# Patient Record
Sex: MALE | Race: WHITE | NOT HISPANIC OR LATINO | Employment: FULL TIME | URBAN - METROPOLITAN AREA
[De-identification: names, ages, dates, MRNs, and addresses within clinical notes are randomized per-mention and may not be internally consistent; named-entity substitution may affect disease eponyms.]

---

## 2018-07-13 ENCOUNTER — TRANSCRIBE ORDERS (OUTPATIENT)
Dept: ADMINISTRATIVE | Facility: HOSPITAL | Age: 28
End: 2018-07-13

## 2018-07-13 DIAGNOSIS — R94.5 ABNORMAL LIVER FUNCTION: Primary | ICD-10-CM

## 2018-07-20 ENCOUNTER — APPOINTMENT (OUTPATIENT)
Dept: ULTRASOUND IMAGING | Facility: HOSPITAL | Age: 28
End: 2018-07-20

## 2018-08-01 ENCOUNTER — HOSPITAL ENCOUNTER (OUTPATIENT)
Dept: ULTRASOUND IMAGING | Facility: HOSPITAL | Age: 28
Discharge: HOME | End: 2018-08-01
Admitting: FAMILY MEDICINE

## 2018-08-01 DIAGNOSIS — R94.5 ABNORMAL LIVER FUNCTION: ICD-10-CM

## 2018-08-01 PROCEDURE — 76705 ECHO EXAM OF ABDOMEN: CPT

## 2024-09-27 DIAGNOSIS — Z76.0 ENCOUNTER FOR ISSUE OF REPEAT PRESCRIPTION: Primary | ICD-10-CM

## 2024-09-30 RX ORDER — ESCITALOPRAM OXALATE 10 MG/1
10 TABLET ORAL DAILY
Qty: 90 TABLET | Refills: 1 | Status: SHIPPED | OUTPATIENT
Start: 2024-09-30

## 2024-09-30 RX ORDER — LISINOPRIL 10 MG/1
10 TABLET ORAL DAILY
Qty: 90 TABLET | Refills: 1 | Status: SHIPPED | OUTPATIENT
Start: 2024-09-30

## 2024-10-07 ENCOUNTER — TELEPHONE (OUTPATIENT)
Age: 34
End: 2024-10-07
Payer: COMMERCIAL

## 2025-04-10 DIAGNOSIS — Z76.0 ENCOUNTER FOR ISSUE OF REPEAT PRESCRIPTION: ICD-10-CM

## 2025-04-10 RX ORDER — LISINOPRIL 10 MG/1
10 TABLET ORAL DAILY
Qty: 90 TABLET | Refills: 1 | OUTPATIENT
Start: 2025-04-10

## 2025-04-10 RX ORDER — ESCITALOPRAM OXALATE 10 MG/1
10 TABLET ORAL DAILY
Qty: 90 TABLET | Refills: 1 | OUTPATIENT
Start: 2025-04-10

## 2025-04-10 NOTE — TELEPHONE ENCOUNTER
Caller: Mauro Starkey    Relationship: Self    Best call back number: [phone]    Requested Prescriptions:   Requested Prescriptions     Pending Prescriptions Disp Refills    escitalopram (Lexapro) 10 MG tablet 90 tablet 1     Sig: Take 1 tablet by mouth Daily.    lisinopril (PRINIVIL,ZESTRIL) 10 MG tablet 90 tablet 1     Sig: Take 1 tablet by mouth Daily.     Pharmacy where request should be sent: [facility] #[id number] - [address] AT [place] [address] - [phone] [facility] [phone] FX     Last office visit with prescribing clinician: Visit date not found   Last telemedicine visit with prescribing clinician: Visit date not found   Next office visit with prescribing clinician: Visit date not found     Additional details provided by patient: PATIENT NEEDS THESE APPROVED.     Brent Lacey Rep   04/10/25 13:36 EDT

## 2025-04-15 DIAGNOSIS — Z76.0 ENCOUNTER FOR ISSUE OF REPEAT PRESCRIPTION: ICD-10-CM

## 2025-04-15 RX ORDER — ESCITALOPRAM OXALATE 10 MG/1
10 TABLET ORAL DAILY
Qty: 30 TABLET | Refills: 0 | Status: SHIPPED | OUTPATIENT
Start: 2025-04-15

## 2025-04-15 RX ORDER — LISINOPRIL 10 MG/1
10 TABLET ORAL DAILY
Qty: 30 TABLET | Refills: 1 | Status: SHIPPED | OUTPATIENT
Start: 2025-04-15

## 2025-04-15 NOTE — TELEPHONE ENCOUNTER
Caller: Mauro Starkey LORENA    Relationship: Self    Best call back number: [phone]     Requested Prescriptions:   Requested Prescriptions     Pending Prescriptions Disp Refills    lisinopril (PRINIVIL,ZESTRIL) 10 MG tablet 90 tablet 1     Sig: Take 1 tablet by mouth Daily.    escitalopram (Lexapro) 10 MG tablet 90 tablet 1     Sig: Take 1 tablet by mouth Daily.        Pharmacy where request should be sent: [facility] #[id number] - [address] AT [place] [address] - [phone] [facility] [phone] FX     Last office visit with prescribing clinician: Visit date not found   Last telemedicine visit with prescribing clinician: Visit date not found   Next office visit with prescribing clinician: Visit date not found     Additional details provided by patient: PATIENT'S WIFE IS A WEEK OVER DUE TO HAVE BAB, SHE HAS HAD A ROUGH PREGNANCY, THIS IS MAKING IT HARD FOR THE PATIENT TO MAKE AND KEEP APPOINTMENTS      Would you like a call back once the refill request has been completed: [x] Yes [] No    If the office needs to give you a call back, can they leave a voicemail: [x] Yes [] No    Brent Marie Rep   04/15/25 09:28 EDT

## 2025-04-25 ENCOUNTER — OFFICE VISIT (OUTPATIENT)
Age: 35
End: 2025-04-25
Payer: COMMERCIAL

## 2025-04-25 VITALS
TEMPERATURE: 96.8 F | OXYGEN SATURATION: 97 % | RESPIRATION RATE: 18 BRPM | BODY MASS INDEX: 41.58 KG/M2 | WEIGHT: 297 LBS | HEIGHT: 71 IN | DIASTOLIC BLOOD PRESSURE: 82 MMHG | SYSTOLIC BLOOD PRESSURE: 120 MMHG | HEART RATE: 82 BPM

## 2025-04-25 DIAGNOSIS — E66.01 MORBID (SEVERE) OBESITY DUE TO EXCESS CALORIES: ICD-10-CM

## 2025-04-25 DIAGNOSIS — F41.9 ANXIETY: ICD-10-CM

## 2025-04-25 DIAGNOSIS — E78.2 MODERATE MIXED HYPERLIPIDEMIA NOT REQUIRING STATIN THERAPY: Primary | ICD-10-CM

## 2025-04-25 DIAGNOSIS — R68.82 LOW LIBIDO: ICD-10-CM

## 2025-04-25 DIAGNOSIS — I10 ESSENTIAL HYPERTENSION, BENIGN: ICD-10-CM

## 2025-04-25 DIAGNOSIS — Z12.5 SCREENING PSA (PROSTATE SPECIFIC ANTIGEN): ICD-10-CM

## 2025-04-25 DIAGNOSIS — Z76.0 ENCOUNTER FOR ISSUE OF REPEAT PRESCRIPTION: ICD-10-CM

## 2025-04-25 DIAGNOSIS — K21.9 GASTROESOPHAGEAL REFLUX DISEASE WITHOUT ESOPHAGITIS: ICD-10-CM

## 2025-04-25 RX ORDER — ESCITALOPRAM OXALATE 10 MG/1
10 TABLET ORAL DAILY
Qty: 30 TABLET | Refills: 0 | Status: SHIPPED | OUTPATIENT
Start: 2025-04-25

## 2025-04-25 RX ORDER — LISINOPRIL 10 MG/1
10 TABLET ORAL DAILY
Qty: 30 TABLET | Refills: 1 | Status: SHIPPED | OUTPATIENT
Start: 2025-04-25

## 2025-04-25 NOTE — PROGRESS NOTES
Chief Complaint  No chief complaint on file.    Subjective        Mauro Starkey presents to [facility]  History of Present Illness    History of Present Illness  The patient presents for an annual wellness visit with a chief complaint of weight management difficulties and associated health concerns. He is accompanied by his wife.    He reports a desire to lose weight, particularly after the recent birth of his child and with a 6-year-old at home. 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 Approximately 100 pounds have been gained since college, with 70 to 80 pounds added post-marriage. He expresses a preference against taking injections like Wegovy or Ozempic for weight loss.     Stress management is a challenge, with coping mechanisms including holding in stress and occasional alcohol consumption. Impulsive eating habits are noted, often driven by boredom rather than hunger. He reports no issues with water intake but occasionally craves sugary drinks.    Severe heartburn has been experienced over the past 6 months, initially triggered by foods such as pizza or pasta with red sauce. A similar episode 2 to 3 years ago was diagnosed as costochondritis. Palpitations are occasionally felt when lying down, which he monitors using a smartwatch. His current heart rate is 83 bpm at rest.    A decrease in libido is reported, attributed to his busy schedule and weight gain, with no erectile dysfunction noted. He is uncertain if the decreased libido is a side effect of Lexapro. Interest in undergoing a vasectomy and exploring natural methods of testosterone enhancement is expressed.    Currently, he is on citalopram 10 mg, prescribed by Dr. Alcantara, which has significantly improved his mental state.    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 He has a  6-year-old child and recently had a baby last week.    FAMILY HISTORY  His maternal grandfather passed away from a heart attack or stroke in his 60s or 70s.       Objective   Vital Signs:  There were no vitals taken for this visit.  There is no height or weight on file to calculate BMI.    BMI cannot be calculated due to outdated height or weight values.  Please input a current height/weight in Vitals and re-renter BMIFOLLOWUP in Note to pull in correct documentation based on BMI range.       Review of Systems   Physical Exam  Constitutional:       General: He is not in acute distress.     Appearance: Normal appearance. He is obese. He is not toxic-appearing.   HENT:      Head: Normocephalic and atraumatic.      Right Ear: Tympanic membrane, ear canal and external ear normal. There is no impacted cerumen. Tympanic membrane is not erythematous.      Left Ear: Ear canal and external ear normal. There is no impacted cerumen. Tympanic membrane is not erythematous.      Nose: Nose normal. No congestion or rhinorrhea.      Mouth/Throat:      Mouth: Mucous membranes are moist.      Tongue: No lesions. Tongue does not deviate from midline.      Pharynx: Oropharynx is clear. No pharyngeal swelling, oropharyngeal exudate, posterior oropharyngeal erythema or uvula swelling.      Tonsils: No tonsillar exudate or tonsillar abscesses.   Eyes:      General: Lids are normal. Vision grossly intact. Gaze aligned appropriately. No scleral icterus.     Extraocular Movements: Extraocular movements intact.      Conjunctiva/sclera: Conjunctivae normal.      Pupils: Pupils are equal, round, and reactive to light.   Neck:      Vascular: No carotid bruit.   Cardiovascular:      Rate and Rhythm: Normal rate and regular rhythm.      Pulses: Normal pulses.      Heart sounds: Normal heart sounds. No murmur heard.     No friction rub. No gallop.   Pulmonary:      Effort: Pulmonary effort is normal. No respiratory distress.      Breath sounds:  Normal breath sounds. No stridor. No wheezing, rhonchi or rales.   Chest:      Chest wall: No tenderness.   Abdominal:      General: Abdomen is flat. There is no distension.      Palpations: Abdomen is soft. There is no mass.      Tenderness: There is no abdominal tenderness. There is no right CVA tenderness, left CVA tenderness, guarding or rebound.      Hernia: No hernia is present.   Musculoskeletal:         General: Normal range of motion.      Cervical back: No rigidity or tenderness.      Right lower leg: No edema.      Left lower leg: No edema.   Lymphadenopathy:      Cervical: No cervical adenopathy.   Skin:     General: Skin is warm and dry.      Capillary Refill: Capillary refill takes less than 2 seconds.      Coloration: Skin is not jaundiced or pale.      Findings: No bruising, erythema or lesion.   Neurological:      General: No focal deficit present.      Mental Status: He is alert and oriented to person, place, and time. Mental status is at baseline.   Psychiatric:         Mood and Affect: Mood normal.         Behavior: Behavior normal. Behavior is cooperative.         Thought Content: Thought content normal.         Judgment: Judgment normal.      Result Review :          Results  Labs   - LDL cholesterol: 2023, 131 mg/dL   - Triglycerides: 2023, Elevated   - HDL cholesterol: 2023, Low   - LDL cholesterol: 2023, Normal              Assessment and Plan   There are no diagnoses linked to this encounter.    Assessment & Plan  1. Obesity.  - Expressed a desire to lose weight but prefers not to use medications like Wegovy or Ozempic. Gained approximately 100 pounds since college and has a family history of obesity.  - Advised to engage in cardiovascular and strength training exercises. Discussed potential benefits and risks associated with GLP-1 medications for weight loss.  - Encouraged to adopt a diet rich in leafy green vegetables, protein, and lower carbohydrates, while avoiding alcohol, sodas,  "juice, potatoes, buns, and bread. Emphasized importance of mindful eating, distinguishing between hunger and satiety.  - Lab order for BMP, A1c, CBC, liver function tests, testosterone (free and total), and iron panels placed.    2. Gastroesophageal Reflux Disease (GERD).  - Reported experiencing heartburn over the past six months, sometimes triggered by foods like pizza or pasta with red sauce.  - Advised to monitor diet and avoid foods that trigger heartburn.  - Discussed possibility of reflux contributing to symptoms of a \"weird feeling\" in chest when lying down.  - Recommended dietary modifications and monitoring symptoms.    3. Decreased Libido.  - Reported a decrease in libido, potentially related to weight gain and overall health. Discussed potential impact of Lexapro on libido.  - Advised to engage in regular physical activity and weight-bearing exercises to help manage symptoms.  - Referral to urologist for vasectomy made. Lab order for testosterone (free and total) and iron panels placed.  - Discussed natural ways to enhance testosterone levels through exercise and diet.    4. Anxiety.  - Currently taking citalopram 10 mg, which has helped manage stress and anxiety.  - Advised to continue current medication regimen.  - Discussed importance of stress management and potential impact on overall health.    Follow-up  Scheduled for a follow-up visit in 1 month.           Follow Up   No follow-ups on file.  Patient was given instructions and counseling regarding his condition or for health maintenance advice. Please see specific information pulled into the AVS if appropriate.         Patient or patient representative verbalized consent for the use of Ambient Listening during the visit with  [facility] for chart documentation. 4/25/2025  16:50 EDT       "

## 2025-05-01 LAB
ALBUMIN SERPL-MCNC: 4.7 G/DL (ref 4.1–5.1)
ALP SERPL-CCNC: 70 IU/L (ref 44–121)
ALT SERPL-CCNC: 62 IU/L (ref 0–44)
AST SERPL-CCNC: 35 IU/L (ref 0–40)
BASOPHILS # BLD AUTO: 0 X10E3/UL (ref 0–0.2)
BASOPHILS NFR BLD AUTO: 1 %
BILIRUB DIRECT SERPL-MCNC: 0.26 MG/DL (ref 0–0.4)
BILIRUB SERPL-MCNC: 0.9 MG/DL (ref 0–1.2)
BUN SERPL-MCNC: 16 MG/DL (ref 6–20)
BUN/CREAT SERPL: 16 (ref 9–20)
CALCIUM SERPL-MCNC: 9.4 MG/DL (ref 8.7–10.2)
CHLORIDE SERPL-SCNC: 103 MMOL/L (ref 96–106)
CHOLEST SERPL-MCNC: 164 MG/DL (ref 100–199)
CO2 SERPL-SCNC: 24 MMOL/L (ref 20–29)
CREAT SERPL-MCNC: 1 MG/DL (ref 0.76–1.27)
EGFRCR SERPLBLD CKD-EPI 2021: 101 ML/MIN/1.73
EOSINOPHIL # BLD AUTO: 0.4 X10E3/UL (ref 0–0.4)
EOSINOPHIL NFR BLD AUTO: 7 %
ERYTHROCYTE [DISTWIDTH] IN BLOOD BY AUTOMATED COUNT: 12.3 % (ref 11.6–15.4)
GLUCOSE SERPL-MCNC: 94 MG/DL (ref 70–99)
HBA1C MFR BLD: 5.4 % (ref 4.8–5.6)
HCT VFR BLD AUTO: 46.7 % (ref 37.5–51)
HDLC SERPL-MCNC: 36 MG/DL
HGB BLD-MCNC: 15.8 G/DL (ref 13–17.7)
IMM GRANULOCYTES # BLD AUTO: 0 X10E3/UL (ref 0–0.1)
IMM GRANULOCYTES NFR BLD AUTO: 0 %
LDLC SERPL CALC-MCNC: 107 MG/DL (ref 0–99)
LYMPHOCYTES # BLD AUTO: 1.9 X10E3/UL (ref 0.7–3.1)
LYMPHOCYTES NFR BLD AUTO: 34 %
MCH RBC QN AUTO: 30 PG (ref 26.6–33)
MCHC RBC AUTO-ENTMCNC: 33.8 G/DL (ref 31.5–35.7)
MCV RBC AUTO: 89 FL (ref 79–97)
MONOCYTES # BLD AUTO: 0.5 X10E3/UL (ref 0.1–0.9)
MONOCYTES NFR BLD AUTO: 9 %
NEUTROPHILS # BLD AUTO: 2.8 X10E3/UL (ref 1.4–7)
NEUTROPHILS NFR BLD AUTO: 49 %
PLATELET # BLD AUTO: 261 X10E3/UL (ref 150–450)
POTASSIUM SERPL-SCNC: 4.7 MMOL/L (ref 3.5–5.2)
PSA SERPL-MCNC: 0.2 NG/ML (ref 0–4)
RBC # BLD AUTO: 5.26 X10E6/UL (ref 4.14–5.8)
SODIUM SERPL-SCNC: 139 MMOL/L (ref 134–144)
TESTOST FREE SERPL-MCNC: 12.4 PG/ML (ref 8.7–25.1)
TESTOST SERPL-MCNC: 281 NG/DL (ref 264–916)
TRIGL SERPL-MCNC: 112 MG/DL (ref 0–149)
TSH SERPL DL<=0.005 MIU/L-ACNC: 1.02 UIU/ML (ref 0.45–4.5)
VLDLC SERPL CALC-MCNC: 21 MG/DL (ref 5–40)
WBC # BLD AUTO: 5.5 X10E3/UL (ref 3.4–10.8)

## 2025-05-14 DIAGNOSIS — K21.9 GASTROESOPHAGEAL REFLUX DISEASE WITHOUT ESOPHAGITIS: ICD-10-CM

## 2025-05-14 DIAGNOSIS — F41.9 ANXIETY: ICD-10-CM

## 2025-05-14 DIAGNOSIS — I10 ESSENTIAL HYPERTENSION, BENIGN: ICD-10-CM

## 2025-05-14 DIAGNOSIS — Z76.0 ENCOUNTER FOR ISSUE OF REPEAT PRESCRIPTION: ICD-10-CM

## 2025-05-14 RX ORDER — ESCITALOPRAM OXALATE 10 MG/1
10 TABLET ORAL DAILY
Qty: 30 TABLET | Refills: 11 | Status: SHIPPED | OUTPATIENT
Start: 2025-05-14

## 2025-06-02 ENCOUNTER — OFFICE VISIT (OUTPATIENT)
Age: 35
End: 2025-06-02
Payer: COMMERCIAL

## 2025-06-02 VITALS
RESPIRATION RATE: 18 BRPM | WEIGHT: 295 LBS | SYSTOLIC BLOOD PRESSURE: 120 MMHG | TEMPERATURE: 96.6 F | DIASTOLIC BLOOD PRESSURE: 84 MMHG | OXYGEN SATURATION: 96 % | HEIGHT: 71 IN | BODY MASS INDEX: 41.3 KG/M2 | HEART RATE: 72 BPM

## 2025-06-02 DIAGNOSIS — K76.0 FATTY LIVER: Primary | ICD-10-CM

## 2025-06-02 DIAGNOSIS — E66.01 MORBID (SEVERE) OBESITY DUE TO EXCESS CALORIES: ICD-10-CM

## 2025-06-02 NOTE — PROGRESS NOTES
Chief Complaint  Results (Pt is here to follow up on his previous blood work)    Subjective        Mauro Starkey presents to [facility]    Follow up  Onset was at an unknown time.       History of Present Illness  The patient presents for evaluation of weight management, obstructive sleep apnea, and fatty liver disease.    He has not undergone any testing for obstructive sleep apnea. His wife has observed intermittent snoring, but it is not a consistent occurrence. He occasionally experiences fatigue upon waking, which he attributes to stress rather than sleep issues. He does not have difficulty initiating sleep.    He acknowledges the need to resume his exercise regimen, which he had previously discontinued. His goal is to achieve a weight loss of 50 pounds by the end of the year through dietary modifications and increased physical activity. He has been incorporating more fruits into his diet, as per previous advice, and has improved his portion control, although he does not engage in calorie counting. His lifestyle and work commitments often result in irregular meal patterns, with breakfast typically consisting of a protein bar and lunch varying in size and content. He often consumes his main meal at the end of the day. He has made efforts to reduce his alcohol intake, currently consuming alcohol 1 to 2 nights per week, down from 4 to 5 nights. He has transitioned from beer and bourbon to Michelob Ultra and vodka with soda water. He has also reduced his consumption of Coke, now drinking only one can per day. He has been maintaining a daily water intake of 64 ounces for the past 3 weeks. He uses local honey as a natural remedy for allergies.    He was diagnosed with fatty liver disease in 2018 but has never been informed of having liver disease or cirrhosis.    SOCIAL HISTORY  He drinks alcohol 1 to 2 nights a week, down from 4 to 5 nights a week. He has cut back on beer and  "bourbon and now drinks Michelob Ultra and vodka with soda water.       Objective   Vital Signs:  /84 (BP Location: Right arm, Patient Position: Sitting, Cuff Size: Adult)   Pulse 72   Temp 96.6 °F (35.9 °C) (Oral)   Resp 18   Ht 180 cm (70.87\")   Wt 134 kg (295 lb)   SpO2 96%   BMI 41.30 kg/m²   Estimated body mass index is 41.3 kg/m² as calculated from the following:    Height as of this encounter: 180 cm (70.87\").    Weight as of this encounter: 134 kg (295 lb).    Class 3 Severe Obesity (BMI >=40). Obesity-related health conditions include the following: none. Obesity is worsening. BMI is is above average; BMI management plan is completed. We discussed portion control and increasing exercise.       Review of Systems   Physical Exam  Constitutional:       General: He is not in acute distress.     Appearance: Normal appearance. He is obese. He is not toxic-appearing.   HENT:      Head: Normocephalic and atraumatic.      Right Ear: Tympanic membrane, ear canal and external ear normal. There is no impacted cerumen. Tympanic membrane is not erythematous.      Left Ear: Ear canal and external ear normal. There is no impacted cerumen. Tympanic membrane is not erythematous.      Nose: Nose normal. No congestion or rhinorrhea.      Mouth/Throat:      Mouth: Mucous membranes are moist.      Tongue: No lesions. Tongue does not deviate from midline.      Pharynx: Oropharynx is clear. No pharyngeal swelling, oropharyngeal exudate, posterior oropharyngeal erythema or uvula swelling.      Tonsils: No tonsillar exudate or tonsillar abscesses.   Eyes:      General: Lids are normal. Vision grossly intact. Gaze aligned appropriately. No scleral icterus.     Extraocular Movements: Extraocular movements intact.      Conjunctiva/sclera: Conjunctivae normal.      Pupils: Pupils are equal, round, and reactive to light.   Neck:      Vascular: No carotid bruit.   Cardiovascular:      Rate and Rhythm: Normal rate and regular " rhythm.      Pulses: Normal pulses.      Heart sounds: Normal heart sounds. No murmur heard.     No friction rub. No gallop.   Pulmonary:      Effort: Pulmonary effort is normal. No respiratory distress.      Breath sounds: Normal breath sounds. No stridor. No wheezing, rhonchi or rales.   Chest:      Chest wall: No tenderness.   Abdominal:      General: Abdomen is flat. There is no distension.      Palpations: Abdomen is soft. There is no mass.      Tenderness: There is no abdominal tenderness. There is no right CVA tenderness, left CVA tenderness, guarding or rebound.      Hernia: No hernia is present.   Musculoskeletal:         General: Normal range of motion.      Cervical back: No rigidity or tenderness.      Right lower leg: No edema.      Left lower leg: No edema.   Lymphadenopathy:      Cervical: No cervical adenopathy.   Skin:     General: Skin is warm and dry.      Capillary Refill: Capillary refill takes less than 2 seconds.      Coloration: Skin is not jaundiced or pale.      Findings: No bruising, erythema or lesion.   Neurological:      General: No focal deficit present.      Mental Status: He is alert and oriented to person, place, and time. Mental status is at baseline.   Psychiatric:         Mood and Affect: Mood normal.         Behavior: Behavior normal. Behavior is cooperative.         Thought Content: Thought content normal.         Judgment: Judgment normal.        Result Review :          Results  Labs   - ALT: Slightly elevated   - AST: Levels have decreased   - ALT: Levels have decreased   - Cholesterol: Fairly decent              Assessment and Plan   Diagnoses and all orders for this visit:    1. Fatty liver (Primary)    2. Morbid (severe) obesity due to excess calories        Assessment & Plan  1. Obstructive Sleep Apnea.  - Given his elevated BMI, there is a potential for obstructive sleep apnea.  - Advised to maintain a sleep journal to monitor snoring frequency and daytime fatigue  levels.  - If weight loss proves challenging despite dietary and exercise efforts, treatment for obstructive sleep apnea will be considered.  - Discussed the importance of sleep for overall health and the potential impact of untreated sleep apnea.    2. Weight Management.  - BMI remains above the desired level of 30.  - Counseled on the importance of reducing BMI through dietary modifications and regular exercise.  - Encouraged to incorporate more plant-based foods into the diet, limit sugar and carbohydrate intake, and reduce alcohol consumption.  - Recommended the use of the [employer]iva yovanny for food tracking and advised to weigh himself weekly under consistent conditions.    3. Fatty Liver Disease.  - Liver enzymes have shown improvement, with a decrease in both AST and ALT levels.  - Informed that further weight loss and continued reduction in alcohol and sugar intake would likely improve fatty liver disease.  - Discussed the resilience of the liver and the potential for further improvement with lifestyle changes.  - Reviewed the patient's history of fatty liver disease and clarified that it is not cirrhosis, thought it can lead to cirrhosis.     Follow-up  The patient will follow up in 3 months.           Follow Up   Return in about 3 months (around 9/2/2025).  Patient was given instructions and counseling regarding his condition or for health maintenance advice. Please see specific information pulled into the AVS if appropriate.         Patient or patient representative verbalized consent for the use of Ambient Listening during the visit with  [facility] for chart documentation. 6/11/2025  13:32 EDT